# Patient Record
Sex: FEMALE | Race: WHITE | URBAN - METROPOLITAN AREA
[De-identification: names, ages, dates, MRNs, and addresses within clinical notes are randomized per-mention and may not be internally consistent; named-entity substitution may affect disease eponyms.]

---

## 2018-12-13 ENCOUNTER — THERAPY VISIT (OUTPATIENT)
Dept: PHYSICAL THERAPY | Facility: CLINIC | Age: 63
End: 2018-12-13
Payer: COMMERCIAL

## 2018-12-13 DIAGNOSIS — K56.41 OBSTRUCTIVE DEFECATION (H): ICD-10-CM

## 2018-12-13 DIAGNOSIS — M99.05 PELVIC SOMATIC DYSFUNCTION: ICD-10-CM

## 2018-12-13 PROCEDURE — 97530 THERAPEUTIC ACTIVITIES: CPT | Mod: GP | Performed by: PHYSICAL THERAPIST

## 2018-12-13 PROCEDURE — 97140 MANUAL THERAPY 1/> REGIONS: CPT | Mod: GP | Performed by: PHYSICAL THERAPIST

## 2018-12-13 PROCEDURE — 97161 PT EVAL LOW COMPLEX 20 MIN: CPT | Mod: GP | Performed by: PHYSICAL THERAPIST

## 2018-12-13 PROCEDURE — 97112 NEUROMUSCULAR REEDUCATION: CPT | Mod: GP | Performed by: PHYSICAL THERAPIST

## 2018-12-13 NOTE — LETTER
"[facility] PHYSICAL THERAPY  [address]  [facility] [address]  [phone]    2018    Re: Jonna Rich   :   1955  MRN:  [id number]   REFERRING PHYSICIAN:   Letty Abdi    [facility] PHYSICAL [facility]    Date of Initial Evaluation:  18  Visits:  Rxs Used: 1  Reason for Referral:     Pelvic somatic dysfunction  Obstructive defecation    EVALUATION SUMMARY    [facility] Initial Evaluation  Subjective:  HPI  SUBJECTIVE:  Patient reports onset of symptoms of fecal incontinence, brown liquid that can leak at times.  She complains of incomplete emptying when she feels an urge.  Recently also noticing urinary incontinence.  Pt reports this has been ongoing for years.  Saw pelvic floor Center in 2017.  She had a rectal prolapse and had surgery in 2017.  No changes after the surgery. Frustrated because has to wear a pad daily and is in the bathroom constantly.   Symptoms include some pain in anal area feels like an urge.  Has to assist digitally to evacuate.  Since onset symptoms have been getting better, worse or staying the same? Same to worse.  Patient reports that she is not consistent with meal times, not consistent with taking miralax, takes probiotics but hasn't noticed a difference.  Pt goals are to have \"no more leaking and less frequent bowel movements.  Some days feels like I spent and entire day in the bathroom\".  She reports yesterday was a bad day, was in the bathroom 20 times due to urge to have a BM but not successful.  Pertinent medical history includes:  High blood pressure and heart problems (Changes in bowel/bladder).  Medical allergies: yes (Penicillin).  Other surgeries include:  Heart surgery (6 heart stints).  Current medications:  High blood pressure medication and cardiac medication.    Employment status: .  Employment tasks: Computer " work.  Urination:  Do you leak on the way to the bathroom or with a strong urge to void? Yes    Do you leak with cough,sneeze, jumping, running?No   Any other activities that cause leaking? no  Do you have triggers that make you feel you can't wait to go to the bathroom? No.  Re: Jonna Rich   :   1955    Type of pad and number used per day? Always #5, wears 3 of them for fecal incontinence  When you leak what is the amount? small    How long can you delay the need to urinate? Reports can wait 2-4 hours between voids.   How many times do you get up to urinate at night? 0  Can you stop the flow of urine when on the toilet? unsure  Is the volume of urine passed usually: medium. (8sec rule=  250ml with average bladder storing  400-600ml)    Do you strain to pass urine? No  Do you have a slow or hesitant urinary stream? No  Do you have difficulty initiating the urine stream? No    How many bladder infections have you had in last 12 months?none    Fluid intake(one glass is 8oz or one cup) 4+ glasses/day, 1 caffinated glasses/day  occasional alcohol glasses/day.    Bowel habits:  Frequency of bowel movements? Can be in the bathroom aabout 20 times/week  Consistancy of stool? soft, [place] Stool Scale variable and minimal that comes out  Do you ignore the urge to defecate? No  Do you strain to pass stool? Yes    Pelvic Pain:  When do you have pelvic pain? When feels like she has to have a BM  Is initial penetration during intercourse painful? Yes  Is deeper penetration painful? Yes  Do you use lubricant? Yes   Given birth? Yes Any complications?no , # of vaginal delieveries?1,   Are you sexually active?has partner but has been years since she has had intercourse due to pain  Have you ever been worried for your physical safety? No  Any abdominal or pelvic surgeries? Prolapse surgery in 2017  Are you having any regular exercise?yes  Have you practiced the PF(kegel) exercises for 4 or more weeks?juliann Huynh  Scale:Level 3  (Level 3: Abstinence from intercourse because of severe pain. Level 2: Painful intercourse which limites frequency of activity. Level 1: Painful intercourse not severe enough to prevent activity.)  Objective:  System              Re: Jonna Rich   :   1955         Pelvic Dysfunction Evaluation:    Abdominal Wall:  Abdominal wall pelvic: decreased fascial mobility lower abdomen, slightly distended.  Pelvic Clock Exam:  Pelvic clock exam: EAS WNL, tightness noted palpation to PFM rectally/tight coccygeus.  Levator ANI:  +  External Assessment:  External assessment pelvic: noted hemorroids on EAS.  with relaxation/push to bear down, noted brown liquid leaking afterwards.  Skin Condition:  Irritation  Bearing Down/Coughing:  Normal  Tissue Symmetry:  Normal  Introitus:  Normal  Muscle Contraction/Perineal Mobility:  Slight lift, no urogential triangle descent and substitution  Internal Assessment:    Contraction/Grade:  Fair squeeze, definite lift (3)  SEMG Biofeedback:    Equipment:  MR20  PlanetTran electrode placement--Perianal:  Yes  Baseline EMG PM:  Resting tone 1.0-1.5 uV.  Patient presents with poor relaxation of PFM and poor coordination of abdomen with push for bowel movement, note PFM activity on biofeedback unit.  Position:  Sitting and supine     General   ROS    Assessment/Plan:    Patient is a 63 year old female with pelvic complaints.    Patient has the following significant findings with corresponding treatment plan.                Diagnosis 1:  Obstructive defecation/fecal incontinence/pelvic floor dysfunction  Pain -  manual therapy, self management, education and home program  Decreased strength - therapeutic exercise, therapeutic activities and home program  Impaired muscle performance - biofeedback, neuro re-education and home program  Decreased function - therapeutic activities and home program    Therapy Evaluation Codes:   1) History comprised of:   Personal factors that  impact the plan of care:      None.    Comorbidity factors that impact the plan of care are:      Bowel/bladder changes, Heart problems and High blood pressure.     Medications impacting care: Cardiac and High blood pressure.  2) Examination of Body Systems comprised of:   Body structures and functions that impact the plan of care:      Pelvis.   Activity limitations that impact the plan of care are:      Fecal incontinence.  3) Clinical presentation characteristics are:   Stable/Uncomplicated.      Re: Jonna Rich   :   1955    4) Decision-Making    Low complexity using standardized patient assessment instrument and/or measureable assessment of functional outcome.  Cumulative Therapy Evaluation is: Low complexity.    Communication ability:  Patient appears to be able to clearly communicate and understand verbal and written communication and follow directions correctly.  Treatment Explanation - The following has been discussed with the patient:   RX ordered/plan of care  Anticipated outcomes  Possible risks and side effects  This patient would benefit from PT intervention to resume normal activities.   Rehab potential is good.  Frequency:  1 X week, once daily  Duration:  for 8 weeks  Discharge Plan:  Achieve all LTG.  Independent in home treatment program.  Reach maximal therapeutic benefit.    Thank you for your referral.    INQUIRIES  Therapist: Germán White DPT   [facility] PHYSICAL THERAPY  [address] [place] [address] [address] [place] [id number]  Phone: [phone]  Fax: [phone]

## 2018-12-13 NOTE — PROGRESS NOTES
[facility] Initial Evaluation  Subjective:                                       Pertinent medical history includes:  High blood pressure and heart problems (Changes in bowel/bladder).  Medical allergies: yes (Penicillin).  Other surgeries include:  Heart surgery (6 heart stints).  Current medications:  High blood pressure medication and cardiac medication.    Employment status: .  Employment tasks: Computer work.                                Objective:  System    Physical Exam    General     ROS    Assessment/Plan:

## 2018-12-13 NOTE — PROGRESS NOTES
"[facility] Initial Evaluation  Subjective:  HPI  SUBJECTIVE:  Patient reports onset of symptoms of fecal incontinence, brown liquid that can leak at times.  She complains of incomplete emptying when she feels an urge.  Recently also noticing urinary incontinence.  Pt reports this has been ongoing for years.  Saw pelvic floor Center in July 2017.  She had a rectal prolapse and had surgery in June 2017.  No changes after the surgery.   Frustrated because has to wear a pad daily and is in the bathroom constantly.   Symptoms include some pain in anal area feels like an urge.  Has to assist digitally to evacuate.  Since onset symptoms have been getting better, worse or staying the same? Same to worse.  Patient reports that she is not consistent with meal times, not consistent with taking miralax, takes probiotics but hasn't noticed a difference.  Pt goals are to have \"no more leaking and less frequent bowel movements.  Some days feels like I spent and entire day in the bathroom\".  She reports yesterday was a bad day, was in the bathroom 20 times due to urge to have a BM but not successful.  Urination:  Do you leak on the way to the bathroom or with a strong urge to void? Yes    Do you leak with cough,sneeze, jumping, running?No   Any other activities that cause leaking? no  Do you have triggers that make you feel you can't wait to go to the bathroom? No.  Type of pad and number used per day? Always #5, wears 3 of them for fecal incontinence  When you leak what is the amount? small    How long can you delay the need to urinate? Reports can wait 2-4 hours between voids.   How many times do you get up to urinate at night? 0  Can you stop the flow of urine when on the toilet? unsure  Is the volume of urine passed usually: medium. (8sec rule=  250ml with average bladder storing  400-600ml)    Do you strain to pass urine? No  Do you have a slow or hesitant urinary stream? No  Do you have difficulty " initiating the urine stream? No    How many bladder infections have you had in last 12 months?none    Fluid intake(one glass is 8oz or one cup) 4+ glasses/day, 1 caffinated glasses/day  occasional alcohol glasses/day.    Bowel habits:  Frequency of bowel movements? Can be in the bathroom aabout 20 times/week  Consistancy of stool? soft, [place] Stool Scale variable and minimal that comes out  Do you ignore the urge to defecate? No  Do you strain to pass stool? Yes    Pelvic Pain:  When do you have pelvic pain? When feels like she has to have a BM  Is initial penetration during intercourse painful? Yes  Is deeper penetration painful? Yes  Do you use lubricant? Yes       Given birth? Yes Any complications?no , # of vaginal delieveries?1,   Are you sexually active?has partner but has been years since she has had intercourse due to pain  Have you ever been worried for your physical safety? No  Any abdominal or pelvic surgeries? Prolapse surgery in 2017  Are you having any regular exercise?yes  Have you practiced the PF(kegel) exercises for 4 or more weeks?no    Marinoff Scale:Level 3  (Level 3: Abstinence from intercourse because of severe pain. Level 2: Painful intercourse which limites frequency of activity. Level 1: Painful intercourse not severe enough to prevent activity.)                                    Objective:  System                                 Pelvic Dysfunction Evaluation:          Abdominal Wall:  Abdominal wall pelvic: decreased fascial mobility lower abdomen, slightly distended.        Pelvic Clock Exam:  Pelvic clock exam: EAS WNL, tightness noted palpation to PFM rectally/tight coccygeus.        Levator ANI:  +        External Assessment:  External assessment pelvic: noted hemorroids on EAS.  with relaxation/push to bear down, noted brown liquid leaking afterwards.  Skin Condition:  Irritation    Bearing Down/Coughing:  Normal  Tissue Symmetry:  Normal  Introitus:  Normal  Muscle  Contraction/Perineal Mobility:  Slight lift, no urogential triangle descent and substitution  Internal Assessment:      Contraction/Grade:  Fair squeeze, definite lift (3)          SEMG Biofeedback:    Equipment:  MR20    Suraface electrode placement--Perianal:  Yes  Baseline EMG PM:  Resting tone 1.0-1.5 uV.  Patient presents with poor relaxation of PFM and poor coordination of abdomen with push for bowel movement, note PFM activity on biofeedback unit.          Position:  Sitting and supine                     General     ROS    Assessment/Plan:    Patient is a 63 year old female with pelvic complaints.    Patient has the following significant findings with corresponding treatment plan.                Diagnosis 1:  Obstructive defecation/fecal incontinence/pelvic floor dysfunction  Pain -  manual therapy, self management, education and home program  Decreased strength - therapeutic exercise, therapeutic activities and home program  Impaired muscle performance - biofeedback, neuro re-education and home program  Decreased function - therapeutic activities and home program    Therapy Evaluation Codes:   1) History comprised of:   Personal factors that impact the plan of care:      None.    Comorbidity factors that impact the plan of care are:      Bowel/bladder changes, Heart problems and High blood pressure.     Medications impacting care: Cardiac and High blood pressure.  2) Examination of Body Systems comprised of:   Body structures and functions that impact the plan of care:      Pelvis.   Activity limitations that impact the plan of care are:      Fecal incontinence.  3) Clinical presentation characteristics are:   Stable/Uncomplicated.  4) Decision-Making    Low complexity using standardized patient assessment instrument and/or measureable assessment of functional outcome.  Cumulative Therapy Evaluation is: Low complexity.    Previous and current functional limitations:  (See Goal Flow Sheet for this  information)    Short term and Long term goals: (See Goal Flow Sheet for this information)     Communication ability:  Patient appears to be able to clearly communicate and understand verbal and written communication and follow directions correctly.  Treatment Explanation - The following has been discussed with the patient:   RX ordered/plan of care  Anticipated outcomes  Possible risks and side effects  This patient would benefit from PT intervention to resume normal activities.   Rehab potential is good.    Frequency:  1 X week, once daily  Duration:  for 8 weeks  Discharge Plan:  Achieve all LTG.  Independent in home treatment program.  Reach maximal therapeutic benefit.    Please refer to the daily flowsheet for treatment today, total treatment time and time spent performing 1:1 timed codes.

## 2019-01-04 ENCOUNTER — THERAPY VISIT (OUTPATIENT)
Dept: PHYSICAL THERAPY | Facility: CLINIC | Age: 64
End: 2019-01-04
Payer: COMMERCIAL

## 2019-01-04 DIAGNOSIS — K56.41 OBSTRUCTIVE DEFECATION (H): ICD-10-CM

## 2019-01-04 DIAGNOSIS — M99.05 PELVIC SOMATIC DYSFUNCTION: ICD-10-CM

## 2019-01-04 PROCEDURE — 97530 THERAPEUTIC ACTIVITIES: CPT | Mod: GP | Performed by: PHYSICAL THERAPIST

## 2019-01-04 PROCEDURE — 97140 MANUAL THERAPY 1/> REGIONS: CPT | Mod: GP | Performed by: PHYSICAL THERAPIST

## 2019-01-04 PROCEDURE — 97112 NEUROMUSCULAR REEDUCATION: CPT | Mod: GP | Performed by: PHYSICAL THERAPIST

## 2019-02-12 ENCOUNTER — THERAPY VISIT (OUTPATIENT)
Dept: PHYSICAL THERAPY | Facility: CLINIC | Age: 64
End: 2019-02-12
Payer: COMMERCIAL

## 2019-02-12 DIAGNOSIS — M99.05 PELVIC SOMATIC DYSFUNCTION: ICD-10-CM

## 2019-02-12 DIAGNOSIS — K56.41 OBSTRUCTIVE DEFECATION (H): ICD-10-CM

## 2019-02-12 PROCEDURE — 97530 THERAPEUTIC ACTIVITIES: CPT | Mod: GP | Performed by: PHYSICAL THERAPIST

## 2019-02-12 PROCEDURE — 97140 MANUAL THERAPY 1/> REGIONS: CPT | Mod: GP | Performed by: PHYSICAL THERAPIST

## 2019-02-12 NOTE — PROGRESS NOTES
"PROGRESS  REPORT    Progress reporting period is from 12/13/2018 to 1/12/2019.       SUBJECTIVE  Subjective changes noted by patient:  .  Subjective: pt reports was doing really well on her own for a couple of weeks and was feeling good but last 2 weeks felt symptoms return.  Unable to fully complete BM and has had fecal incontinence at work.  She is frustrated.  No change in eating habits so unsure why    Current pain level is   .     Previous pain level was    .   Changes in function:  Yes (See Goal flowsheet attached for changes in current functional level)  Adverse reaction to treatment or activity: None    OBJECTIVE  Changes noted in objective findings:    Objective: PFM internal palpation vaginally tenderness and hypertonus noted, rectal also hypertonus noted upon palpation.  Resting tone 2.8 uV supine start of care, post vaginal MFR 1.6 uV, post internal rectal 0.3 uV.  Pt does demonstrate ability to contract and relax and \"bulge perineum with push\".  Does hold her breathe with push     ASSESSMENT/PLAN  Updated problem list and treatment plan: Diagnosis 1:  Pelvic floor dysfunction/fecal incontinence  Pain -  manual therapy, self management, education and home program  Decreased ROM/flexibility - manual therapy, therapeutic exercise, therapeutic activity and home program  Impaired muscle performance - biofeedback, neuro re-education and home program  Decreased function - therapeutic activities and home program  STG/LTGs have been met or progress has been made towards goals:  Yes (See Goal flow sheet completed today.)  Assessment of Progress: The patient's condition has potential to improve.  Self Management Plans:  Patient has been instructed in a home treatment program.  Patient  has been instructed in self management of symptoms.  I have re-evaluated this patient and find that the nature, scope, duration and intensity of the therapy is appropriate for the medical condition of the patient.  Jonna " continues to require the following intervention to meet STG and LTG's:  PT    Recommendations:  This patient would benefit from continued therapy.     Frequency:  2 X a month, once daily  Duration:  for 2 months        Please refer to the daily flowsheet for treatment today, total treatment time and time spent performing 1:1 timed codes.

## 2019-03-14 ENCOUNTER — THERAPY VISIT (OUTPATIENT)
Dept: PHYSICAL THERAPY | Facility: CLINIC | Age: 64
End: 2019-03-14
Payer: COMMERCIAL

## 2019-03-14 DIAGNOSIS — K56.41 OBSTRUCTIVE DEFECATION (H): ICD-10-CM

## 2019-03-14 DIAGNOSIS — M99.05 PELVIC SOMATIC DYSFUNCTION: ICD-10-CM

## 2019-03-14 PROCEDURE — 97140 MANUAL THERAPY 1/> REGIONS: CPT | Mod: GP | Performed by: PHYSICAL THERAPIST

## 2019-03-14 PROCEDURE — 97530 THERAPEUTIC ACTIVITIES: CPT | Mod: GP | Performed by: PHYSICAL THERAPIST

## 2019-03-26 ENCOUNTER — THERAPY VISIT (OUTPATIENT)
Dept: PHYSICAL THERAPY | Facility: CLINIC | Age: 64
End: 2019-03-26
Payer: COMMERCIAL

## 2019-03-26 DIAGNOSIS — M99.05 PELVIC SOMATIC DYSFUNCTION: ICD-10-CM

## 2019-03-26 DIAGNOSIS — K56.41 OBSTRUCTIVE DEFECATION (H): ICD-10-CM

## 2019-03-26 PROCEDURE — 97140 MANUAL THERAPY 1/> REGIONS: CPT | Mod: GP | Performed by: PHYSICAL THERAPIST

## 2019-05-07 ENCOUNTER — THERAPY VISIT (OUTPATIENT)
Dept: PHYSICAL THERAPY | Facility: CLINIC | Age: 64
End: 2019-05-07
Payer: COMMERCIAL

## 2019-05-07 DIAGNOSIS — K56.41 OBSTRUCTIVE DEFECATION (H): ICD-10-CM

## 2019-05-07 DIAGNOSIS — M99.05 PELVIC SOMATIC DYSFUNCTION: ICD-10-CM

## 2019-05-07 PROCEDURE — 97530 THERAPEUTIC ACTIVITIES: CPT | Mod: GP | Performed by: PHYSICAL THERAPIST

## 2019-05-07 NOTE — PROGRESS NOTES
DISCHARGE REPORT    SUBJECTIVE  Subjective changes noted by patient:  .  Subjective: Pt returns after last visit on 3/26/2019.  This is her 6th visit.  She has been using the therawand consistently for self trigger point release.  Reports that she is doing very well.  No longer has pain in rectal region with sitting, driving.  She is able to completely empty her bowels and feels that she is now able to have a more normal quality of life.  Pt has been able to manage on her own for over a month.    Current pain level is 0/10  .     Previous pain level was  0/10  .   Changes in function:  Yes (See Goal flowsheet attached for changes in current functional level)  Adverse reaction to treatment or activity: None    OBJECTIVE  Changes noted in objective findings:    Objective: focus today was discussing self management techniques, review of proper defecation, recognizing possible food triggers and continueing to stay regular with meals and BM.  She will continue to use therawand for self release as needed.  Pt feels she is ready to continue on her own.     ASSESSMENT/PLAN  Updated problem list and treatment plan: Diagnosis 1:  Obstructive defecation/fecal incontinence/pelvic floor dysfunction      STG/LTGs have been met or progress has been made towards goals:  Yes (See Goal flow sheet completed today.)  Assessment of Progress: The patient has met all of their long term goals.  Self Management Plans:  Patient is independent in a home treatment program.  Patient is independent in self management of symptoms.  I have re-evaluated this patient and find that the nature, scope, duration and intensity of the therapy is appropriate for the medical condition of the patient.  Jonna continues to require the following intervention to meet STG and LTG's:  PT intervention is no longer required to meet STG/LTG.    Recommendations:  This patient is ready to be discharged from therapy and continue their home treatment  program.    Please refer to the daily flowsheet for treatment today, total treatment time and time spent performing 1:1 timed codes.

## 2019-05-07 NOTE — LETTER
[facility] PHYSICAL THERAPY  [address]  [facility] [address]  [phone]    May 7, 2019    Re: Jonna Rich   :   1955  MRN:  [id number]   REFERRING PHYSICIAN:   Letty Abdi    [facility] PHYSICAL [facility]    Date of Initial Evaluation:  18  Visits:  Rxs Used: 6  Reason for Referral:     Pelvic somatic dysfunction  Obstructive defecation    EVALUATION SUMMARY    DISCHARGE REPORT    SUBJECTIVE  Subjective changes noted by patient:  Subjective: Pt returns after last visit on 3/26/2019.  This is her 6th visit.  She has been using the therawand consistently for self trigger point release.  Reports that she is doing very well.  No longer has pain in rectal region with sitting, driving.  She is able to completely empty her bowels and feels that she is now able to have a more normal quality of life.  Pt has been able to manage on her own for over a month.    Current pain level is 0/10.     Previous pain level was  0/10.   Changes in function:  Yes (See Goal flowsheet attached for changes in current functional level)  Adverse reaction to treatment or activity: None    OBJECTIVE  Changes noted in objective findings:    Objective: focus today was discussing self management techniques, review of proper defecation, recognizing possible food triggers and continueing to stay regular with meals and BM.  She will continue to use therawand for self release as needed.  Pt feels she is ready to continue on her own.     ASSESSMENT/PLAN  Updated problem list and treatment plan: Diagnosis 1:  Obstructive defecation/fecal incontinence/pelvic floor dysfunction      STG/LTGs have been met or progress has been made towards goals:  Yes (See Goal flow sheet completed today.)  Assessment of Progress: The patient has met all of their long term goals.  Self Management Plans:  Patient is independent in a home treatment program.  Patient is  independent in self management of symptoms.        Re: Jonna Rich   :   1955    I have re-evaluated this patient and find that the nature, scope, duration and intensity of the therapy is appropriate for the medical condition of the patient.  Jonna continues to require the following intervention to meet STG and LTG's:  PT intervention is no longer required to meet STG/LTG.    Recommendations:  This patient is ready to be discharged from therapy and continue their home treatment program.    Thank you for your referral.    INQUIRIES  Therapist: Germán White DPT   [facility] [place] PHYSICAL THERAPY  [address] [place] [address] [id number]  [facility] [address]  Phone: [phone]  Fax: [phone]